# Patient Record
Sex: FEMALE | Race: WHITE | NOT HISPANIC OR LATINO | Employment: FULL TIME | URBAN - METROPOLITAN AREA
[De-identification: names, ages, dates, MRNs, and addresses within clinical notes are randomized per-mention and may not be internally consistent; named-entity substitution may affect disease eponyms.]

---

## 2023-10-04 ENCOUNTER — TELEPHONE (OUTPATIENT)
Dept: PRIMARY CARE | Facility: CLINIC | Age: 53
End: 2023-10-04

## 2023-10-04 ENCOUNTER — LAB REQUISITION (OUTPATIENT)
Dept: LAB | Facility: HOSPITAL | Age: 53
End: 2023-10-04

## 2023-10-04 DIAGNOSIS — N39.0 URINARY TRACT INFECTION, SITE NOT SPECIFIED: ICD-10-CM

## 2023-10-04 PROCEDURE — 87086 URINE CULTURE/COLONY COUNT: CPT

## 2023-10-04 PROCEDURE — 87186 SC STD MICRODIL/AGAR DIL: CPT

## 2023-10-04 NOTE — TELEPHONE ENCOUNTER
Pat called as she has UTI symptoms. She mentioned that she has burning, frequency, trouble making it to a bathroom. She is asking if she can be seen

## 2023-10-07 LAB — BACTERIA UR CULT: ABNORMAL

## 2025-01-04 ENCOUNTER — OFFICE VISIT (OUTPATIENT)
Dept: URGENT CARE | Age: 55
End: 2025-01-04
Payer: COMMERCIAL

## 2025-01-04 VITALS
BODY MASS INDEX: 28.13 KG/M2 | SYSTOLIC BLOOD PRESSURE: 134 MMHG | HEART RATE: 85 BPM | OXYGEN SATURATION: 97 % | WEIGHT: 185 LBS | DIASTOLIC BLOOD PRESSURE: 83 MMHG | TEMPERATURE: 97.7 F | RESPIRATION RATE: 18 BRPM

## 2025-01-04 DIAGNOSIS — L08.9 WOUND INFECTION: Primary | ICD-10-CM

## 2025-01-04 DIAGNOSIS — T14.8XXA WOUND INFECTION: Primary | ICD-10-CM

## 2025-01-04 RX ORDER — CEPHALEXIN 500 MG/1
500 CAPSULE ORAL 4 TIMES DAILY
Qty: 30 CAPSULE | Refills: 0 | Status: SHIPPED | OUTPATIENT
Start: 2025-01-04 | End: 2025-01-11

## 2025-01-04 ASSESSMENT — ENCOUNTER SYMPTOMS: WOUND: 1

## 2025-01-04 NOTE — PROGRESS NOTES
Subjective   Patient ID: Courtney Sanchez is a 54 y.o. female. They present today with a chief complaint of kneeled on staple (Pt states she kneeled on a old staple yesterday and believes is infected, red around the area, discharge, swelling, warm to touch).    History of Present Illness  54-year-old female patient presents accompanied by her  with complaints of pain swelling and drainage from her left knee.  Patient states last evening she kneeled on an old soraya staple and believes that it is now infected.  She denies any fevers or chills but does states that the swelling has gotten slightly larger and the redness surrounding the wound is also getting larger.      History provided by:  Patient      Past Medical History  Allergies as of 01/04/2025 - Reviewed 01/04/2025   Allergen Reaction Noted    Ciprofloxacin Hives and Unknown 02/03/2015    Sulfamethoxazole-trimethoprim Unknown 01/04/2025       (Not in a hospital admission)       No past medical history on file.    No past surgical history on file.     reports that she has never smoked. She has never used smokeless tobacco.    Review of Systems  Review of Systems   Skin:  Positive for wound.        Left knee wound, swelling, redness and drainage                                  Objective    Vitals:    01/04/25 1831   BP: 134/83   BP Location: Left arm   Patient Position: Sitting   BP Cuff Size: Large adult   Pulse: 85   Resp: 18   Temp: 36.5 °C (97.7 °F)   TempSrc: Oral   SpO2: 97%   Weight: 83.9 kg (185 lb)     No LMP recorded.    Physical Exam  Skin:     Findings: Erythema and wound present.                Procedures    Point of Care Test & Imaging Results from this visit  No results found for this visit on 01/04/25.   No results found.    Diagnostic study results (if any) were reviewed by Crystal L Severino, APRN-CNP.    Assessment/Plan   Allergies, medications, history, and pertinent labs/EKGs/Imaging reviewed by Crystal L Severino, APRN-CNP.      Medical Decision Making  Small wound to the left knee with erythema, edema, warmth.  Center of wound is a small blackened area, patient is worried could be start of MRSA as her daughter had it several times last year.  Upon examination, the blackened area looks to be dried blood and scabbed area.  Rx for keflex is given.  At time of discharge patient was clinically well-appearing and HDS for outpatient management. The patient and/or family was educated regarding diagnosis, supportive care, OTC and Rx medications. The patient and/or family was given the opportunity to ask questions prior to discharge.  They verbalized understanding of my discussion of the plans for treatment, expected course, indications to return to  or seek further evaluation in ED, and the need for timely follow up as directed.   They were provided with a work/school excuse if requested.      Orders and Diagnoses  Diagnoses and all orders for this visit:  Wound infection  Keep wound clean and dry  Antibiotic ointment and band-aid      Medical Admin Record      Patient disposition: Home    Electronically signed by Crystal L Severino, APRN-CNP  6:50 PM

## 2025-04-25 ENCOUNTER — OFFICE VISIT (OUTPATIENT)
Dept: PRIMARY CARE | Facility: CLINIC | Age: 55
End: 2025-04-25
Payer: COMMERCIAL

## 2025-04-25 VITALS
BODY MASS INDEX: 28.34 KG/M2 | WEIGHT: 186.4 LBS | RESPIRATION RATE: 18 BRPM | HEART RATE: 81 BPM | OXYGEN SATURATION: 99 % | TEMPERATURE: 98.6 F

## 2025-04-25 DIAGNOSIS — S05.01XA ABRASION OF RIGHT CORNEA, INITIAL ENCOUNTER: Primary | ICD-10-CM

## 2025-04-25 PROCEDURE — 1036F TOBACCO NON-USER: CPT | Performed by: FAMILY MEDICINE

## 2025-04-25 PROCEDURE — 99213 OFFICE O/P EST LOW 20 MIN: CPT | Performed by: FAMILY MEDICINE

## 2025-04-25 ASSESSMENT — LIFESTYLE VARIABLES
HOW MANY STANDARD DRINKS CONTAINING ALCOHOL DO YOU HAVE ON A TYPICAL DAY: PATIENT DOES NOT DRINK
AUDIT-C TOTAL SCORE: 0
SKIP TO QUESTIONS 9-10: 1
HOW OFTEN DO YOU HAVE A DRINK CONTAINING ALCOHOL: NEVER
HOW OFTEN DO YOU HAVE SIX OR MORE DRINKS ON ONE OCCASION: NEVER

## 2025-04-25 ASSESSMENT — ENCOUNTER SYMPTOMS
OCCASIONAL FEELINGS OF UNSTEADINESS: 0
EYE REDNESS: 1
PHOTOPHOBIA: 1
EYE PAIN: 1
DEPRESSION: 0
LOSS OF SENSATION IN FEET: 0
FEVER: 0

## 2025-04-25 ASSESSMENT — PAIN SCALES - GENERAL: PAINLEVEL_OUTOF10: 2

## 2025-04-25 ASSESSMENT — PATIENT HEALTH QUESTIONNAIRE - PHQ9: 2. FEELING DOWN, DEPRESSED OR HOPELESS: NOT AT ALL

## 2025-04-25 NOTE — PROGRESS NOTES
Subjective   Patient ID: Courtney Sanchez is a 54 y.o. female who presents for Conjunctivitis (Started 3 days ago right eye/Balance off and feels nauseas).    Conjunctivitis   The current episode started 3 to 5 days ago. The problem is mild. Associated symptoms include decreased vision, photophobia, eye pain and eye redness. Pertinent negatives include no fever.   Patient feels like she scratched trying  Been working on the house flipping it  Not sure if she got something in her eye or she describes dry  Positive photophobia  Positive redness  Symptoms going on for 2 to 3 days  Tried over-the-counter eyedrops without relief of symptoms    Review of Systems   Constitutional:  Negative for fever.   Eyes:  Positive for photophobia, pain and redness.       Objective   Pulse 81   Temp 37 °C (98.6 °F) (Temporal)   Resp 18   Wt 84.6 kg (186 lb 6.4 oz)   SpO2 99%   BMI 28.34 kg/m²     Physical Exam  Vitals reviewed.   Constitutional:       Appearance: Normal appearance.   Cardiovascular:      Rate and Rhythm: Normal rate and regular rhythm.      Heart sounds: Normal heart sounds. No murmur heard.  Pulmonary:      Breath sounds: Normal breath sounds.   Abdominal:      General: Abdomen is flat. Bowel sounds are normal.      Palpations: Abdomen is soft.   Musculoskeletal:         General: No swelling.   Neurological:      Mental Status: She is alert.     Corneal abrasion noted in her pupillary region  Slight erythema of her conjunctiva      Assessment/Plan   Corneal abrasion right eye  Tobramycin ophthalmic ointment thin ribbon to lower lid blanket in 3 times daily for the next 5 to 7 days  Follow-up if worse  No contacts for 2 weeks

## 2025-04-28 ENCOUNTER — TELEPHONE (OUTPATIENT)
Dept: PRIMARY CARE | Facility: CLINIC | Age: 55
End: 2025-04-28
Payer: COMMERCIAL

## 2025-04-28 NOTE — TELEPHONE ENCOUNTER
See how patient is doing regarding her corneal abrasion.  If she needs a reevaluation please make follow-up appointment

## 2025-04-30 NOTE — TELEPHONE ENCOUNTER
Courtney is calling stating her corneal abrasion is doing 70% better. She is asking how long does she need to use her eye gel medication? She is stating she is on day 5 today.     Courtney's # [phone]

## 2025-07-08 DIAGNOSIS — Z12.31 ENCOUNTER FOR SCREENING MAMMOGRAM FOR BREAST CANCER: ICD-10-CM

## 2025-07-22 ENCOUNTER — APPOINTMENT (OUTPATIENT)
Dept: RADIOLOGY | Facility: HOSPITAL | Age: 55
End: 2025-07-22
Payer: COMMERCIAL

## 2025-07-22 VITALS — WEIGHT: 186 LBS | BODY MASS INDEX: 28.19 KG/M2 | HEIGHT: 68 IN

## 2025-07-22 DIAGNOSIS — Z12.31 ENCOUNTER FOR SCREENING MAMMOGRAM FOR BREAST CANCER: ICD-10-CM

## 2025-07-22 PROCEDURE — 77067 SCR MAMMO BI INCL CAD: CPT | Performed by: STUDENT IN AN ORGANIZED HEALTH CARE EDUCATION/TRAINING PROGRAM

## 2025-07-22 PROCEDURE — 77067 SCR MAMMO BI INCL CAD: CPT

## 2025-07-22 PROCEDURE — 77063 BREAST TOMOSYNTHESIS BI: CPT | Performed by: STUDENT IN AN ORGANIZED HEALTH CARE EDUCATION/TRAINING PROGRAM

## 2025-07-23 ENCOUNTER — TELEPHONE (OUTPATIENT)
Dept: PRIMARY CARE | Facility: CLINIC | Age: 55
End: 2025-07-23
Payer: COMMERCIAL

## 2025-07-23 NOTE — TELEPHONE ENCOUNTER
Ruth  Pt called to say she saw the [employer] message and she's confirming that she needs to go get additional mammogram test, the order is in, she'll call and schedule her appt.

## 2025-07-24 ENCOUNTER — HOSPITAL ENCOUNTER (OUTPATIENT)
Dept: RADIOLOGY | Facility: HOSPITAL | Age: 55
Discharge: HOME | End: 2025-07-24
Payer: COMMERCIAL

## 2025-07-24 DIAGNOSIS — R92.8 ABNORMAL SCREENING MAMMOGRAM: ICD-10-CM

## 2025-07-24 PROCEDURE — 77065 DX MAMMO INCL CAD UNI: CPT | Mod: RIGHT SIDE | Performed by: RADIOLOGY

## 2025-07-24 PROCEDURE — 77065 DX MAMMO INCL CAD UNI: CPT | Mod: RT

## 2025-08-11 ENCOUNTER — PRE-ADMISSION TESTING (OUTPATIENT)
Dept: PREADMISSION TESTING | Facility: HOSPITAL | Age: 55
End: 2025-08-11
Payer: COMMERCIAL

## 2025-08-11 ENCOUNTER — HOSPITAL ENCOUNTER (OUTPATIENT)
Dept: RADIOLOGY | Facility: HOSPITAL | Age: 55
Discharge: HOME | End: 2025-08-11
Payer: COMMERCIAL

## 2025-08-11 VITALS
HEART RATE: 65 BPM | DIASTOLIC BLOOD PRESSURE: 58 MMHG | OXYGEN SATURATION: 98 % | SYSTOLIC BLOOD PRESSURE: 108 MMHG | RESPIRATION RATE: 18 BRPM

## 2025-08-11 VITALS
SYSTOLIC BLOOD PRESSURE: 130 MMHG | DIASTOLIC BLOOD PRESSURE: 81 MMHG | HEART RATE: 76 BPM | RESPIRATION RATE: 16 BRPM | TEMPERATURE: 96.8 F | OXYGEN SATURATION: 97 %

## 2025-08-11 DIAGNOSIS — R92.1 BREAST CALCIFICATION, RIGHT: ICD-10-CM

## 2025-08-11 DIAGNOSIS — R92.8 ABNORMAL MAMMOGRAM: Primary | ICD-10-CM

## 2025-08-11 PROCEDURE — 19081 BX BREAST 1ST LESION STRTCTC: CPT | Mod: RIGHT SIDE | Performed by: RADIOLOGY

## 2025-08-11 PROCEDURE — 88305 TISSUE EXAM BY PATHOLOGIST: CPT | Mod: TC,GEALAB | Performed by: NURSE PRACTITIONER

## 2025-08-11 PROCEDURE — 19082 BX BREAST ADD LESION STRTCTC: CPT | Mod: RT

## 2025-08-11 PROCEDURE — 2720000007 HC OR 272 NO HCPCS

## 2025-08-11 PROCEDURE — 19082 BX BREAST ADD LESION STRTCTC: CPT | Mod: RIGHT SIDE | Performed by: RADIOLOGY

## 2025-08-11 PROCEDURE — 99202 OFFICE O/P NEW SF 15 MIN: CPT | Performed by: NURSE PRACTITIONER

## 2025-08-11 PROCEDURE — 2780000003 HC OR 278 NO HCPCS

## 2025-08-11 PROCEDURE — C1739 HC OR 278 NO HCPCS: HCPCS

## 2025-08-11 PROCEDURE — 19081 BX BREAST 1ST LESION STRTCTC: CPT | Mod: RT

## 2025-08-11 ASSESSMENT — ENCOUNTER SYMPTOMS
CHILLS: 0
SHORTNESS OF BREATH: 0
FEVER: 0

## 2025-08-11 ASSESSMENT — PAIN SCALES - GENERAL: PAINLEVEL_OUTOF10: 0 - NO PAIN

## 2025-08-13 LAB
LABORATORY COMMENT REPORT: NORMAL
PATH REPORT.FINAL DX SPEC: NORMAL
PATH REPORT.GROSS SPEC: NORMAL
PATH REPORT.TOTAL CANCER: NORMAL

## 2025-08-21 ENCOUNTER — TELEPHONE (OUTPATIENT)
Dept: ORTHOPEDIC SURGERY | Facility: CLINIC | Age: 55
End: 2025-08-21
Payer: COMMERCIAL